# Patient Record
Sex: MALE | Race: BLACK OR AFRICAN AMERICAN | NOT HISPANIC OR LATINO | URBAN - METROPOLITAN AREA
[De-identification: names, ages, dates, MRNs, and addresses within clinical notes are randomized per-mention and may not be internally consistent; named-entity substitution may affect disease eponyms.]

---

## 2024-04-17 ENCOUNTER — EMERGENCY (EMERGENCY)
Facility: HOSPITAL | Age: 49
LOS: 0 days | Discharge: HOME | End: 2024-04-17
Attending: EMERGENCY MEDICINE
Payer: MEDICAID

## 2024-04-17 VITALS
TEMPERATURE: 98 F | WEIGHT: 130.07 LBS | RESPIRATION RATE: 18 BRPM | HEART RATE: 81 BPM | OXYGEN SATURATION: 99 % | SYSTOLIC BLOOD PRESSURE: 144 MMHG | DIASTOLIC BLOOD PRESSURE: 78 MMHG

## 2024-04-17 DIAGNOSIS — M79.645 PAIN IN LEFT FINGER(S): ICD-10-CM

## 2024-04-17 DIAGNOSIS — F17.290 NICOTINE DEPENDENCE, OTHER TOBACCO PRODUCT, UNCOMPLICATED: ICD-10-CM

## 2024-04-17 DIAGNOSIS — G89.29 OTHER CHRONIC PAIN: ICD-10-CM

## 2024-04-17 DIAGNOSIS — M54.50 LOW BACK PAIN, UNSPECIFIED: ICD-10-CM

## 2024-04-17 LAB — HIV 1 & 2 AB SERPL IA.RAPID: SIGNIFICANT CHANGE UP

## 2024-04-17 PROCEDURE — 71046 X-RAY EXAM CHEST 2 VIEWS: CPT

## 2024-04-17 PROCEDURE — 86703 HIV-1/HIV-2 1 RESULT ANTBDY: CPT

## 2024-04-17 PROCEDURE — 99285 EMERGENCY DEPT VISIT HI MDM: CPT

## 2024-04-17 PROCEDURE — 71046 X-RAY EXAM CHEST 2 VIEWS: CPT | Mod: 26

## 2024-04-17 PROCEDURE — 36415 COLL VENOUS BLD VENIPUNCTURE: CPT

## 2024-04-17 PROCEDURE — 99283 EMERGENCY DEPT VISIT LOW MDM: CPT | Mod: 25

## 2024-04-17 NOTE — ED PROVIDER NOTE - PROGRESS NOTE DETAILS
Case discussed with Dr. Mao, ID, minimal chance of patient having tuberculosis Due to patient's previously treated infection..  Chest x-ray reviewed and represents previous granulomatous disease.  This was discussed with the patient.  Patient amenable to HIV testing.

## 2024-04-17 NOTE — ED PROVIDER NOTE - CLINICAL SUMMARY MEDICAL DECISION MAKING FREE TEXT BOX
48-year-old male past medical history as documented, previous treatment for TB complaining of lower back pain, left finger pain and exposure to TB after his wife recently tested positive.  Exam normal.  Patient with left fourth digit trigger finger.  Chest x-ray with right mid lung scarring.  Case discussed with ID and patient is not at risk for tuberculosis infection.  Patient discharged to follow-up with hand and physical therapy.  Patient given return instructions.

## 2024-04-17 NOTE — ED ADULT NURSE NOTE - NS ED NURSE RECORD ANOTHER HT AND WT
Discharge Planning:     (CM) called Sandie with admissions to check the status on his acceptance to the facility so the pre-cert can be started. CM awaiting return call.    Update: 12:27 CM reviewed a call from [facility]. The facility cannot accept this patient at this time.    Electronically signed by Vee Dahl on 1/22/24 at 12:19 PM EST     Yes

## 2024-04-17 NOTE — ED PROVIDER NOTE - OBJECTIVE STATEMENT
48 year-old male with past medical history latent TB treated in 2015 with 6 months of antibiotics in [place], immig[place] from [place] 09/2023, chronic low back pain presents with low back pain and left finger pain. Patient reports low back pain is chronic - localized to medial low back and has not changed. Reports pain exacerbated with extension of lumbar spine. States he is right-hand dominant. Also endorses left 4th digit pain at MCP that is present only in the mornings on waking and feels like his left 4th digit gets "stuck" at MCP and is painful with extension until he moves it around a bit. Reports he used to smoke and recently switched to vaping nicotine. Denies fever/chills, weight loss, chest pain, shortness of breath, abdominal pain, n/v/d, change in bowel/bladder habits, focal numbness/weakness.

## 2024-04-17 NOTE — ED PROVIDER NOTE - PHYSICAL EXAMINATION
Vital Signs: I have reviewed the initial vital signs.  Constitutional: appears stated age, no acute distress  Eyes: Sclera clear, EOMI.  Cardiovascular: S1 and S2, regular rate, regular rhythm, well-perfused extremities, radial pulses equal and 2+, pedal pulses 2+ and equal  Respiratory: unlabored respiratory effort, clear to auscultation bilaterally no wheezing, rales, or rhonchi  Gastrointestinal:  abdomen soft, non-tender  Musculoskeletal: supple neck, no midline spinal tenderness, full range of motion 5/5 strength and sensation intact to left 4th finger MCP/PIP/DIP, no bony tenderness  Integumentary: warm, dry, no rash  Neurologic: awake, alert, oriented x3, extremities’ motor and sensory functions grossly intact

## 2024-04-17 NOTE — ED ADULT NURSE NOTE - NSFALLUNIVINTERV_ED_ALL_ED
Bed/Stretcher in lowest position, wheels locked, appropriate side rails in place/Call bell, personal items and telephone in reach/Instruct patient to call for assistance before getting out of bed/chair/stretcher/Non-slip footwear applied when patient is off stretcher/[place] to call system/Physically safe environment - no spills, clutter or unnecessary equipment/Purposeful proactive rounding/Room/bathroom lighting operational, light cord in reach

## 2024-04-17 NOTE — ED PROVIDER NOTE - NSPTACCESSSVCSAPPTDETAILS_ED_ALL_ED_FT
hand surgery- patient with symptoms of left hand 4th digit trigger finger. (locking sensation at Left 4th MCP)    please also refer patient for PCP (doesn't have one)

## 2024-04-17 NOTE — ED PROVIDER NOTE - PATIENT PORTAL LINK FT
You can access the FollowMyHealth Patient Portal offered by [facility] by registering at the following website: [email or web address]. By joining [facility]’s FollowMyHealth portal, you will also be able to view your health information using other applications (apps) compatible with our system.

## 2024-04-17 NOTE — ED PROVIDER NOTE - EKG/XRAY ADDITIONAL INFORMATION
Independent interpretation of the xray(s) performed by Dr. Josefina Obregon:  ELIZA benavidez scarring

## 2024-04-17 NOTE — ED ADULT TRIAGE NOTE - CHIEF COMPLAINT QUOTE
"im having lower back pain . my wife tested positive for latent tb 1 week ago. I had it in 2015. im also having finer pain in my left hand. I vape and want a chest xray "

## 2024-04-25 NOTE — CHART NOTE - NSCHARTNOTEFT_GEN_A_CORE
[facility] MRN [id number] - advised patient to go to [address] in 4/17, MV. added to ED PT chat- AR 4/18 / Appointment made - REBECCA     Specialty: Physical Therapy  Date: April 29, 2024  Time: 7am  Location: [facility]